# Patient Record
(demographics unavailable — no encounter records)

---

## 2025-05-12 NOTE — HISTORY OF PRESENT ILLNESS
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 Myomectomy at [facility] performed with DDAVP Sept 2024, no bleeding issues with GYN procedure. Taking TXA 1-tab 3x/day after procedure and office RN follow-up phone call done. Reports periods remain heavy, regular monthly lasting 6 days with heaviest on days 1-3, changing tampons q1-1.5hr and periodic clots. Reports not using TXA for menses. Previously taking OCP but stopped for increased BP as pt reports. Periodic headaches and has seen Neuro in past.

## 2025-05-12 NOTE — REASON FOR VISIT
[CPE Visit] : a comprehensive physical exam. [Von Williebrand's Disease] : von williebrand's disease [Source: ______] : History obtained from [unfilled] [FreeTextEntry2] : Type 1 vWD.  used for visit to discuss care. Elizabeth, ID#: [id number].

## 2025-05-12 NOTE — REASON FOR VISIT
[CPE Visit] : a comprehensive physical exam. [Von Williebrand's Disease] : von williebrand's disease [Source: ______] : History obtained from [unfilled] [FreeTextEntry2] : Type 1 vWD.  used for visit to discuss care. Elizabeth, ID#: [id number].

## 2025-05-12 NOTE — HISTORY OF PRESENT ILLNESS
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 Myomectomy at [facility] performed with DDAVP Sept 2024, no bleeding issues with GYN procedure. Taking TXA 1-tab 3x/day after procedure and office RN follow-up phone call done. Reports periods remain heavy, regular monthly lasting 6 days with heaviest on days 1-3, changing tampons q1-1.5hr and periodic clots. Reports not using TXA for menses. Previously taking OCP but stopped for increased BP as pt reports. Periodic headaches and has seen Neuro in past.

## 2025-05-12 NOTE — ASSESSMENT
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 Hx of easy bruising and heavy menses.   vWD -VWF is an important protein playing a crucial role in hemostasis. Involved in primary hemostasis with platelets and secondary hemostasis as a carrier for Factor VIII. Patients with VWD either have decreased quantity of the von Willebrand protein or impaired functioning of the protein. -Patients will have variable degrees of bleeding from trauma or post-procedure with minimal spontaneous bleeding to some patients experiencing significant spontaneous bleeding depending on the subtype of vWD or with mild factor VIII deficiency. -Individuals with vWD or mild factor VIII deficiency are at increased risk of bleeding. They can have increased bleeding after surgeries, minor procedures, and due to trauma. In addition, can experience mucocutaneous type bleeding such epistaxis, abnormal uterine bleeding, gastrointestinal bleeding and post-partum bleeding. -Reviewed signs and symptoms of mucosal and muscle bleeds -Call the [facility] immediately with any bleeding sign/symptom -Recommend calling [facility] prior to procedures/surgeries for treatment recommendations, including dental extraction. -The major options for treatment of bleeding and for jeannette procedural prophylaxis is either DDAVP or von Willebrand factor concentrates depending on the subtype or factor VIII concentrate if mild factor VIII Deficiency. If DDAVP can be used, we will need a trial prior to use. Approximately 10% of patients do not respond to DDAVP. If a non-responder to DDAVP or based on subtype we will need to use VWF concentrate such as Wilate or factor VIII concentrate if mild factor VIII Deficiency. -Counseled against NSAID and ASA use, they cause qualitative platelet dysfunction and can increase bleeding risks. -Patient verbalized understanding and asked relevant questions.  General -Current with PCP, last dentist visit 2024.  -Gyn: Scheduled 8/1/2025, encouraged to discuss IUD options with gyn. Discussed using TXA for heavy menses as prescribed, TID starting on first day of period and continue 5-7 days through length of period. Can increase from 1 pill per dose to 2 pills if necessary.  -Denies new meds, denies new allergies.  -Recommended to follow-up with Neurology regarding migraines.   Met with IDT including PT and Social Work.

## 2025-05-12 NOTE — ASSESSMENT
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 Hx of easy bruising and heavy menses.   vWD -VWF is an important protein playing a crucial role in hemostasis. Involved in primary hemostasis with platelets and secondary hemostasis as a carrier for Factor VIII. Patients with VWD either have decreased quantity of the von Willebrand protein or impaired functioning of the protein. -Patients will have variable degrees of bleeding from trauma or post-procedure with minimal spontaneous bleeding to some patients experiencing significant spontaneous bleeding depending on the subtype of vWD or with mild factor VIII deficiency. -Individuals with vWD or mild factor VIII deficiency are at increased risk of bleeding. They can have increased bleeding after surgeries, minor procedures, and due to trauma. In addition, can experience mucocutaneous type bleeding such epistaxis, abnormal uterine bleeding, gastrointestinal bleeding and post-partum bleeding. -Reviewed signs and symptoms of mucosal and muscle bleeds -Call the [facility] immediately with any bleeding sign/symptom -Recommend calling [facility] prior to procedures/surgeries for treatment recommendations, including dental extraction. -The major options for treatment of bleeding and for jeannette procedural prophylaxis is either DDAVP or von Willebrand factor concentrates depending on the subtype or factor VIII concentrate if mild factor VIII Deficiency. If DDAVP can be used, we will need a trial prior to use. Approximately 10% of patients do not respond to DDAVP. If a non-responder to DDAVP or based on subtype we will need to use VWF concentrate such as Wilate or factor VIII concentrate if mild factor VIII Deficiency. -Counseled against NSAID and ASA use, they cause qualitative platelet dysfunction and can increase bleeding risks. -Patient verbalized understanding and asked relevant questions.  General -Current with PCP, last dentist visit 2024.  -Gyn: Scheduled 8/1/2025, encouraged to discuss IUD options with gyn. Discussed using TXA for heavy menses as prescribed, TID starting on first day of period and continue 5-7 days through length of period. Can increase from 1 pill per dose to 2 pills if necessary.  -Denies new meds, denies new allergies.  -Recommended to follow-up with Neurology regarding migraines.   Met with IDT including PT and Social Work.

## 2025-05-12 NOTE — REASON FOR VISIT
[CPE Visit] : a comprehensive physical exam. [Von Williebrand's Disease] : von williebrand's disease [Source: ______] : History obtained from [unfilled] [FreeTextEntry2] : Type 1 vWD.  used for visit to discuss care. Elizabeth, ID#: [id number].

## 2025-05-12 NOTE — HISTORY OF PRESENT ILLNESS
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 Myomectomy at [facility] performed with DDAVP Sept 2024, no bleeding issues with GYN procedure. Taking TXA 1-tab 3x/day after procedure and office RN follow-up phone call done. Reports periods remain heavy, regular monthly lasting 6 days with heaviest on days 1-3, changing tampons q1-1.5hr and periodic clots. Reports not using TXA for menses. Previously taking OCP but stopped for increased BP as pt reports. Periodic headaches and has seen Neuro in past.